# Patient Record
Sex: MALE | Race: WHITE
[De-identification: names, ages, dates, MRNs, and addresses within clinical notes are randomized per-mention and may not be internally consistent; named-entity substitution may affect disease eponyms.]

---

## 2017-09-08 ENCOUNTER — HOSPITAL ENCOUNTER (OUTPATIENT)
Dept: HOSPITAL 62 - LAB | Age: 62
End: 2017-09-08
Attending: PHYSICIAN ASSISTANT
Payer: COMMERCIAL

## 2017-09-08 DIAGNOSIS — E87.5: Primary | ICD-10-CM

## 2017-09-08 LAB
ANION GAP SERPL CALC-SCNC: 9 MMOL/L (ref 5–19)
BUN SERPL-MCNC: 16 MG/DL (ref 7–20)
CALCIUM: 9.3 MG/DL (ref 8.4–10.2)
CHLORIDE SERPL-SCNC: 101 MMOL/L (ref 98–107)
CO2 SERPL-SCNC: 30 MMOL/L (ref 22–30)
CREAT SERPL-MCNC: 0.78 MG/DL (ref 0.52–1.25)
GLUCOSE SERPL-MCNC: 89 MG/DL (ref 75–110)
POTASSIUM SERPL-SCNC: 4.1 MMOL/L (ref 3.6–5)
SODIUM SERPL-SCNC: 140.2 MMOL/L (ref 137–145)

## 2017-09-08 PROCEDURE — 36415 COLL VENOUS BLD VENIPUNCTURE: CPT

## 2017-09-08 PROCEDURE — 80048 BASIC METABOLIC PNL TOTAL CA: CPT

## 2019-09-19 ENCOUNTER — HOSPITAL ENCOUNTER (OUTPATIENT)
Dept: HOSPITAL 62 - END | Age: 64
Discharge: HOME | End: 2019-09-19
Attending: SURGERY
Payer: COMMERCIAL

## 2019-09-19 VITALS — SYSTOLIC BLOOD PRESSURE: 116 MMHG | DIASTOLIC BLOOD PRESSURE: 89 MMHG

## 2019-09-19 DIAGNOSIS — E06.3: ICD-10-CM

## 2019-09-19 DIAGNOSIS — Z79.899: ICD-10-CM

## 2019-09-19 DIAGNOSIS — Z79.82: ICD-10-CM

## 2019-09-19 DIAGNOSIS — K57.30: ICD-10-CM

## 2019-09-19 DIAGNOSIS — Z12.11: Primary | ICD-10-CM

## 2019-09-19 PROCEDURE — 45378 DIAGNOSTIC COLONOSCOPY: CPT

## 2019-09-19 RX ADMIN — MIDAZOLAM HYDROCHLORIDE ONE MG: 1 INJECTION, SOLUTION INTRAMUSCULAR; INTRAVENOUS at 10:46

## 2019-09-19 RX ADMIN — MIDAZOLAM HYDROCHLORIDE ONE MG: 1 INJECTION, SOLUTION INTRAMUSCULAR; INTRAVENOUS at 10:42

## 2019-09-19 NOTE — DISCHARGE SUMMARY
Discharge Summary (SDC)





- Discharge


Final Diagnosis: 





Extensive sigmoid diverticulosis


Date of Surgery: 09/19/19


Discharge Date: 09/19/19


Condition: Good


Treatment or Instructions: 


               





                  [facility] [place] [address]


                  Phone: [phone]    Fax:  [phone]








            


            POST ENDOSCOPY DISCHARGE INSTRUCTIONS








1.  Diet:  Start clear liquids that a regular diet as tolerated.





2.  Resume all preoperative medications.  All oral anticoagulants and aspirins 

can be resumed 24 hours after procedure.





3.  If a polypectomy was performed some bleeding per rectum may occur.  This 

should stop within 3 days.  If not, please contact the office.





4.  If you had a colonoscopy you may experience some bloating and delayed return

of normal bowel function for several days, your regular bowel movement pattern 

should resume within a week.





5.  Please contact [facility] at [phone] to make an 

appointment with Dr. Kevin for 1 to 3 weeks following procedure.





6.  If you have any questions or concerns regarding your care,treatment plan or 

follow up, please contact our office.





7.  Per clinical guidelines we recommend you undergo a repeat colonoscopy in 10 

years.


Referrals: 


JOSEPH MOSES MD [Primary Care Provider] - 


Discharge Diet: As Tolerated


Discharge Activity: Activity As Tolerated


Home Care Assistance: None Needed


Report the Following to Your Physician Immediately: Shortness of Breath, 

Increase in Pain, Fever over 101 Degrees

## 2019-09-19 NOTE — OPERATIVE REPORT
Operative Report


DATE OF SURGERY: 09/19/19


PREOPERATIVE DIAGNOSIS: Screening for colorectal carcinoma


POSTOPERATIVE DIAGNOSIS: Same; no pathologic findings; Extensive sigmoid 

diverticulosis


OPERATION: Total colonoscopy to cecum with photodocumentation


SURGEON: KEDAR FORBES


ANESTHESIA: Moderate Sedation


TISSUE REMOVED OR ALTERED: none


COMPLICATIONS: 





none


ESTIMATED BLOOD LOSS: scant


PROCEDURE: 





Obtaining informed consent the patient was taken from the preoperative holding 

area to the main endoscopy suite where monitoring devices were attached to  the 

patient.  Plan and surgical timeout were conducted


 


The patient was placed in the left lateral decubitus  position with knees to 

chest.  A perianal examination was performed.  There was no visible or palpable 

anorectal pathology.  Sphincter tone was felt to be normal.


 


The flexible adult colonoscope was advanced through the anal rectal canal, all 

the way to the cecum.  Visualization  of the cecum was achieved and the 

ileocecal valve, the appendiceal orifice and transillumination of the anterior 

abdominal wall.  This was an excellent study on the well-prepped bowel.  The 

colonoscope was withdrawn slowly and methodically checked and the mucosa 

carefully.  There were extensive diverticulosis of sigmoid colon with 

tortuosity, but no fernando stricture. 





  The scope was slowly withdrawn through the anal rectal canal.  Complete 

visualization of the rectum was achieved with photodocumentation.


 


The scope was withdrawn to the patient's anus.  The patient tolerated the 

procedure well and was taken to the recovery area in stable condition.








BAsed on surveillance  guidelines, patient will  be appropriate candidate for 

follow-up colonoscopy in 10 years, or sooner if any symptoms develop.

## 2020-11-23 ENCOUNTER — HOSPITAL ENCOUNTER (OUTPATIENT)
Dept: HOSPITAL 62 - RAD | Age: 65
End: 2020-11-23
Attending: FAMILY MEDICINE
Payer: MEDICARE

## 2020-11-23 DIAGNOSIS — M25.522: Primary | ICD-10-CM

## 2020-11-23 NOTE — RADIOLOGY REPORT (SQ)
EXAM DESCRIPTION:  ELBOW LEFT OVER 2 VIEWS



IMAGES COMPLETED DATE/TIME:  11/23/2020 12:29 pm



REASON FOR STUDY:  LEFT ELBOW PAIN M25.522  PAIN IN LEFT ELBOW



COMPARISON:  None.



NUMBER OF VIEWS:  Four views.



TECHNIQUE:  AP, lateral, and both oblique radiographic images acquired of the left elbow.



LIMITATIONS:  None.



FINDINGS:  MINERALIZATION: Normal.

BONES: Subtle irregularity involving the posterosuperior aspect of the olecranon seen on the lateral 
projection only.  Nondisplaced fracture cannot be excluded.

JOINT: No effusion.

SOFT TISSUES: No soft tissue swelling.  No foreign body.

OTHER: No other significant finding.



IMPRESSION:  Possible nondisplaced fracture posterosuperior aspect of the olecranon seen on the later
al projection only.  No joint effusion.



TECHNICAL DOCUMENTATION:  JOB ID:  [id number]

 2011 [employer]- All Rights Reserved



Reading location - IP/workstation name: [email or web address]